# Patient Record
Sex: MALE | URBAN - METROPOLITAN AREA
[De-identification: names, ages, dates, MRNs, and addresses within clinical notes are randomized per-mention and may not be internally consistent; named-entity substitution may affect disease eponyms.]

---

## 2017-01-30 ENCOUNTER — APPOINTMENT (OUTPATIENT)
Dept: GENERAL RADIOLOGY | Facility: CLINIC | Age: 42
End: 2017-01-30
Attending: EMERGENCY MEDICINE
Payer: OTHER MISCELLANEOUS

## 2017-01-30 ENCOUNTER — HOSPITAL ENCOUNTER (EMERGENCY)
Facility: CLINIC | Age: 42
Discharge: HOME | End: 2017-01-30
Attending: EMERGENCY MEDICINE | Admitting: EMERGENCY MEDICINE
Payer: OTHER MISCELLANEOUS

## 2017-01-30 VITALS
WEIGHT: 150 LBS | BODY MASS INDEX: 24.11 KG/M2 | RESPIRATION RATE: 18 BRPM | SYSTOLIC BLOOD PRESSURE: 124 MMHG | DIASTOLIC BLOOD PRESSURE: 87 MMHG | HEART RATE: 74 BPM | OXYGEN SATURATION: 100 % | HEIGHT: 66 IN | TEMPERATURE: 97 F

## 2017-01-30 DIAGNOSIS — S30.0XXA SACRAL CONTUSION, INITIAL ENCOUNTER: ICD-10-CM

## 2017-01-30 PROCEDURE — 99283 EMERGENCY DEPT VISIT LOW MDM: CPT

## 2017-01-30 PROCEDURE — 72220 X-RAY EXAM SACRUM TAILBONE: CPT

## 2017-01-30 PROCEDURE — 72170 X-RAY EXAM OF PELVIS: CPT

## 2017-01-30 ASSESSMENT — ENCOUNTER SYMPTOMS: BACK PAIN: 1

## 2017-01-30 NOTE — ED AVS SNAPSHOT
[facility] Emergency Department    [address]    [facility] [address]    Phone:  [phone]    Fax:  [phone]                                       Juan Daniel Adams   MRN: [id number]    Department:  [facility] Emergency Department   Date of Visit:  1/30/2017           After Visit Summary Signature Page     I have received my discharge instructions, and my questions have been answered. I have discussed any challenges I see with this plan with the nurse or doctor.    ..........................................................................................................................................  Patient/Patient Representative Signature      ..........................................................................................................................................  Patient Representative Print Name and Relationship to Patient    ..................................................               ................................................  Date                                            Time    ..........................................................................................................................................  Reviewed by Signature/Title    ...................................................              ..............................................  Date                                                            Time

## 2017-01-30 NOTE — ED PROVIDER NOTES
"  History     Chief Complaint:  Tailbone Pain      HPI   Juan Daniel Adams is a 41 year old male who presents with tailbone pain after a mechanical fall. The patient reports earlier today while at work he slipped on ice falling directly onto his buttocks suffering tailbone pain. The patient was concerned given his discomfort and was advised to visit the emergency department which prompts his visit. The patient's pain worsens with ambulation and eases while sitting down and resting. He denies other injury. He does note history of herniated disc and also has concerns his latest fall may have worsened this although he denies radiation of pain into either leg.     Allergies:  Penicillins     Medications:    The patient is not currently taking any prescribed medications.     Past Medical History:    The patient does not have any pertinent past medical history.     Past Surgical History:    The patient has no pertinent surgical history.     Family History:    Diabetes  CAD    Social History:  .  The patient denies smoking.   Marital Status:   [2]     Review of Systems   Musculoskeletal: Positive for back pain (tailbone).   All other systems reviewed and are negative.    Physical Exam   First Vitals:  BP: 127/74 mmHg  Pulse: 74  Temp: 97  F (36.1  C)  Resp: 18  Height: 167.6 cm (5' 6\")  Weight: 68.04 kg (150 lb)  SpO2: 100 %    Physical Exam  Constitutional:  Appears well-developed and well-nourished. Alert. Conversant. Non toxic.  HENT:   Head: Atraumatic.   Nose: Nose normal.  Mouth/Throat: Oral mucosa is clear and moist. no trismus. Pharynx normal. Tonsils symmetric. No tonsillar enlargement, erythema, or exudate.  Eyes: Conjunctivae normal. EOM normal. Pupils equal, round, and reactive to light. No scleral icterus.   Neck: Normal range of motion. Neck supple. No tracheal deviation present.   Cardiovascular: Normal rate, regular rhythm. No gallop. No friction rub. No murmur heard. Symmetric radial artery " pulses   Pulmonary/Chest: Effort normal. No stridor. No respiratory distress. No wheezes. No rales. No rhonchi . No tenderness.   Abdominal: Soft. Bowel sounds normal. No distension. No mass. No tenderness. No rebound. No guarding.   Musculoskeletal: Normal range of motion. No edema. Tenderness lower L spine and top of sacrum (top of gluteal cleft) without step off, ecchymosis, abrasion, crepitus. Pelvis stable. No deformity   No Extremity tenderenss.  Lymph: No cervical adenopathy.   Neurological: Alert and oriented to person, place, and time. Normal strength. CN II-VII intact. No sensory deficit. GCS eye subscore is 4. GCS verbal subscore is 5. GCS motor subscore is 6. Normal coordination   Sensory: Normal light touch sensation bilaterally on the anteromedial thigh (L3), medial malleolus (L4), dorsal first web space (L5), lateral malleolus (S1).  Strength: 5/5 strength bilaterally in the hip flexors (L3), quadriceps (L4), tibialis anterior, EHL (L5), gastrocnemius (S1), and hamstring.  DTRs: symmetric in the patella (2/4)  Negative straight leg raise bilaterally.    Skin: Skin is warm and dry. No rash noted. No pallor. Normal capillary refill.  Psychiatric:  Normal mood. Normal affect.       Emergency Department Course   Imaging:  Radiographic findings were communicated with the patient who voiced understanding of the findings.    XR Sacrym and Coccyx:  No evidence of fracture.  Results per radiology.     XR Pelvis:   No evidence of fracture.  Results per radiology.     Emergency Department Course:  Nursing notes and vitals reviewed.  I performed an exam of the patient as documented above.     The above imaging study(s) were ordered with results noted above.     Findings and plan explained to the Patient. Patient discharged home with instructions regarding supportive care, medications, and reasons to return. The importance of close follow-up was reviewed.     Impression & Plan    Medical Decision Making:  Juan Daniel  Angie is a 41 year old male who came to the emergency department today for pain over his posterior sacrum essentially at the top of his gluteal cleft after he slipped and fell on the ice this morning landing directly on his buttocks. Xray's are negative for any sacral, coccygeal, or pelvic fracture. He doesn't have any tenderness more cephalad over the lumbar spine. There is no bony step off or ecchymosis or abrasion there at this time. I suspect this is likely a contusion with plus or minus sprain of the lower lumbar paraspinous muscles. Fortunately he is not having any symptoms to suggest lumbar radiculopathy or sciatica. At this point I don't think he needs MRI of his lumbar spine or pelvis. He is declining pain meds here in the emergency department and states he only came here because his phone triage insisted he come to the emergency department and not go to his doctor's office. At this point plan of care is supportive management at home with ice, rest, with OTC ibuprofen or tylenol. Follow up with his doctor or return to the emergency department if not having substantial improvement in 5-7 days. Return to the emergency department for worsening symptoms or evolving neurologic symptoms should occur.     Diagnosis:    ICD-10-CM    1. Sacral contusion, initial encounter S30.0XXA        Disposition:  Discharged.    Manolo OLSEN, am serving as a scribe at 12:48 PM on 1/30/2017 to document services personally performed by Dr. Mar, based on my observations and the provider's statements to me.    [facility] EMERGENCY DEPARTMENT        Saturnino Mar MD  02/05/17 0020

## 2017-01-30 NOTE — ED AVS SNAPSHOT
[facility] Emergency Department    [address]    [place] [address]    Phone:  [phone]    Fax:  [phone]                                       Juan Daniel Adams   MRN: [id number]    Department:  [facility] Emergency Department   Date of Visit:  1/30/2017           Patient Information     Date Of Birth          1975        Your diagnoses for this visit were:     Sacral contusion, initial encounter        You were seen by Saturnino Mar MD.      Follow-up Information     Follow up with [facility], [place] In 3 days.    Specialty:  Family Practice    Why:  As needed    Contact information:    [address] [place] [address]  [phone]          Discharge Instructions         Coccyx or Sacrum Contusion    You have a contusion (bruise) of the coccyx or sacrum. The sacrum is the triangular bone at the base of the spine that joins the pelvic bones. The coccyx (tailbone) is the last bone of the sacrum that hangs down in a point like a small tail. Symptoms include swelling and some bleeding under the skin. This injury generaly takes a few weeks to heal. During that time, the bruise will typically change in color from reddish, to purple-blue, to greenish-yellow, then to yellow-brown. A crack (fracture) in the coccyx bone causes the same symptoms as a contusion in this area. Often, x-rays are not taken since the treatment is the same. If you have fracture of the tailbone as well as a contusion, healing generally takes about four weeks or longer.  Home care    Try to find a position of comfort. Try lying on your side with your knees bent up towards your chest and a pillow between your knees.    A bruised tailbone causes pain when sitting. You may try using a donut pillow. This is a foam pillow with a hole in the center to prevent pressure on the tailbone. You can buy this at a pharmacy or orthopedic supply store.    Ice the injured area to help reduce  pain and swelling. Wrap a cold source (ice pack or ice cubes in a plastic bag) in a thin towel. Apply to the bruised area for 20 minutes every 1 to 2 hours the first day. Continue this 3 to 4 times a day until the pain and swelling goes away.    Unless another medication was prescribed, you can take acetaminophen, ibuprofen, or naproxen to control pain. (If you have chronic liver or kidney disease or ever had a stomach ulcer or GI bleeding, talk with your doctor before using these medicines.)  Follow up  Follow up with your health care provider or our staff as advised. Call if you are not improving within 2 weeks.  When to seek medical advice   Call your health care provider right away if you have any of the following:    Increased pain or swelling    Pain that becomes worse or spreads to one or both legs    Weakness or numbness in one or both legs    Loss of bowel or bladder control    Numbness in the groin area    Signs of infection, including warmth, drainage, or increased redness    Frequent bruising for unknown reasons    1743-9311 [employer] [address] [place] [address]. All rights reserved. This information is not intended as a substitute for professional medical care. Always follow your healthcare professional's instructions.          Back Contusion    You have a contusion to your back. A contusion is also called a bruise. There is swelling and some bleeding under the skin. The skin may be purplish. You may have muscle aching and stiffness in the area of the bruise. There are no broken bones.  Contusions heal on their own, without further treatment. However, pain and skin discoloration may take weeks to months to go away.   Home care    Rest. Avoid heavy lifting, strenuous exertion, or any activity that causes pain.    Ice the area to reduce pain and swelling. Put ice cubes in a plastic bag or use a cold pack. (Wrap the cold source in a thin towel. Do not place it directly on  your skin.) Ice the injured area for 20 minutes every 1-2 hours the first day. Continue with ice packs 3-4 times a day for the next two days, then as needed for the relief of pain and swelling.    Take any prescribed pain medication. If none was prescribed, take acetaminophen, ibuprofen, or naproxen to control pain.  Follow-up care  Follow up with your healthcare provider, or as directed. Call if you are not better in 1-2 weeks.  When to seek medical advice  Call your healthcare provider for any of the following:    New or worsening pain    Increased swelling around the bruise    Pain spreads to one or both legs    Weakness or numbness in one or both legs     Loss of bowel or bladder control    Numbness in the groin or genital area    Fever of 100.4 F (38 C) or higher, or as directed by your healthcare provider    5782-4691 [employer]. [address] [place] [address]. All rights reserved. This information is not intended as a substitute for professional medical care. Always follow your healthcare professional's instructions.          24 Hour Appointment Hotline       To make an appointment at any [facility], call [phone] ([phone]). If you don't have a family doctor or clinic, we will help you find one. [facility] clinics are conveniently located to serve the needs of you and your family.             Review of your medicines      Notice     You have not been prescribed any medications.            Procedures and tests performed during your visit     Pelvis XR, 1-2 views    XR Sacrum and Coccyx 2 Views      Orders Needing Specimen Collection     None      Pending Results     Date and Time Order Name Status Description    1/30/2017 1145 XR Sacrum and Coccyx 2 Views Preliminary     1/30/2017 1145 Pelvis XR, 1-2 views Preliminary             Pending Culture Results     No orders found from 1/29/2017 to 1/31/2017.       Test Results from your hospital stay           1/30/2017 12:33  PM - Interface, Radiant Ib      Narrative     PELVIS ONE TO TWO VIEWS, SACRUM AND COCCYX TWO VIEWS January 30, 2017  12:24 PM     HISTORY: Fall, pelvic/sacral pain.    COMPARISON: 9/17/2013.        Impression     IMPRESSION: No evidence of fracture.         1/30/2017 12:33 PM - Interface, Radiant Ib      Narrative     PELVIS ONE TO TWO VIEWS, SACRUM AND COCCYX TWO VIEWS January 30, 2017  12:24 PM     HISTORY: Fall, pelvic/sacral pain.    COMPARISON: 9/17/2013.        Impression     IMPRESSION: No evidence of fracture.                Clinical Quality Measure: Blood Pressure Screening     Your blood pressure was checked while you were in the emergency department today. The last reading we obtained was  BP: 124/87 mmHg . Please read the guidelines below about what these numbers mean and what you should do about them.  If your systolic blood pressure (the top number) is less than 120 and your diastolic blood pressure (the bottom number) is less than 80, then your blood pressure is normal. There is nothing more that you need to do about it.  If your systolic blood pressure (the top number) is 120-139 or your diastolic blood pressure (the bottom number) is 80-89, your blood pressure may be higher than it should be. You should have your blood pressure rechecked within a year by a primary care provider.  If your systolic blood pressure (the top number) is 140 or greater or your diastolic blood pressure (the bottom number) is 90 or greater, you may have high blood pressure. High blood pressure is treatable, but if left untreated over time it can put you at risk for heart attack, stroke, or kidney failure. You should have your blood pressure rechecked by a primary care provider within the next 4 weeks.  If your provider in the emergency department today gave you specific instructions to follow-up with your doctor or provider even sooner than that, you should follow that instruction and not wait for up to 4 weeks for your  "follow-up visit.        Thank you for choosing [facility]       Thank you for choosing [facility] for your care. Our goal is always to provide you with excellent care. Hearing back from our patients is one way we can continue to improve our services. Please take a few minutes to complete the written survey that you may receive in the mail after you visit with us. Thank you!        [facility] Information     [facility] lets you send messages to your doctor, view your test results, renew your prescriptions, schedule appointments and more. To sign up, go to [email or web address] . Click on \"Log in\" on the left side of the screen, which will take you to the Welcome page. Then click on \"Sign up Now\" on the right side of the page.     You will be asked to enter the access code listed below, as well as some personal information. Please follow the directions to create your username and password.     Your access code is: [email or web address]  Expires: 2017  1:16 PM     Your access code will  in 90 days. If you need help or a new code, please call your [facility] clinic or [phone].        Care EveryWhere ID     This is your Care EveryWhere ID. This could be used by other organizations to access your [facility] medical records  [id number]        After Visit Summary       This is your record. Keep this with you and show to your community pharmacist(s) and doctor(s) at your next visit.                  "

## 2017-01-30 NOTE — ED NOTES
Arrives to Ed after fall on ice landed on buttock c/o tailbone pain worse with walking. CMS intact. A/ox 3.

## 2017-01-30 NOTE — DISCHARGE INSTRUCTIONS
Coccyx or Sacrum Contusion    You have a contusion (bruise) of the coccyx or sacrum. The sacrum is the triangular bone at the base of the spine that joins the pelvic bones. The coccyx (tailbone) is the last bone of the sacrum that hangs down in a point like a small tail. Symptoms include swelling and some bleeding under the skin. This injury generaly takes a few weeks to heal. During that time, the bruise will typically change in color from reddish, to purple-blue, to greenish-yellow, then to yellow-brown. A crack (fracture) in the coccyx bone causes the same symptoms as a contusion in this area. Often, x-rays are not taken since the treatment is the same. If you have fracture of the tailbone as well as a contusion, healing generally takes about four weeks or longer.  Home care    Try to find a position of comfort. Try lying on your side with your knees bent up towards your chest and a pillow between your knees.    A bruised tailbone causes pain when sitting. You may try using a donut pillow. This is a foam pillow with a hole in the center to prevent pressure on the tailbone. You can buy this at a pharmacy or orthopedic supply store.    Ice the injured area to help reduce pain and swelling. Wrap a cold source (ice pack or ice cubes in a plastic bag) in a thin towel. Apply to the bruised area for 20 minutes every 1 to 2 hours the first day. Continue this 3 to 4 times a day until the pain and swelling goes away.    Unless another medication was prescribed, you can take acetaminophen, ibuprofen, or naproxen to control pain. (If you have chronic liver or kidney disease or ever had a stomach ulcer or GI bleeding, talk with your doctor before using these medicines.)  Follow up  Follow up with your health care provider or our staff as advised. Call if you are not improving within 2 weeks.  When to seek medical advice   Call your health care provider right away if you have any of the following:    Increased pain or  swelling    Pain that becomes worse or spreads to one or both legs    Weakness or numbness in one or both legs    Loss of bowel or bladder control    Numbness in the groin area    Signs of infection, including warmth, drainage, or increased redness    Frequent bruising for unknown reasons    2448-9168 [employer]. [address] [place] [address]. All rights reserved. This information is not intended as a substitute for professional medical care. Always follow your healthcare professional's instructions.          Back Contusion    You have a contusion to your back. A contusion is also called a bruise. There is swelling and some bleeding under the skin. The skin may be purplish. You may have muscle aching and stiffness in the area of the bruise. There are no broken bones.  Contusions heal on their own, without further treatment. However, pain and skin discoloration may take weeks to months to go away.   Home care    Rest. Avoid heavy lifting, strenuous exertion, or any activity that causes pain.    Ice the area to reduce pain and swelling. Put ice cubes in a plastic bag or use a cold pack. (Wrap the cold source in a thin towel. Do not place it directly on your skin.) Ice the injured area for 20 minutes every 1-2 hours the first day. Continue with ice packs 3-4 times a day for the next two days, then as needed for the relief of pain and swelling.    Take any prescribed pain medication. If none was prescribed, take acetaminophen, ibuprofen, or naproxen to control pain.  Follow-up care  Follow up with your healthcare provider, or as directed. Call if you are not better in 1-2 weeks.  When to seek medical advice  Call your healthcare provider for any of the following:    New or worsening pain    Increased swelling around the bruise    Pain spreads to one or both legs    Weakness or numbness in one or both legs     Loss of bowel or bladder control    Numbness in the groin or genital area    Fever of  100.4 F (38 C) or higher, or as directed by your healthcare provider    5156-0476 [employer]. [address] [place] [address], [place] [address]. All rights reserved. This information is not intended as a substitute for professional medical care. Always follow your healthcare professional's instructions.